# Patient Record
Sex: FEMALE | Race: WHITE
[De-identification: names, ages, dates, MRNs, and addresses within clinical notes are randomized per-mention and may not be internally consistent; named-entity substitution may affect disease eponyms.]

---

## 2017-07-26 ENCOUNTER — HOSPITAL ENCOUNTER (OUTPATIENT)
Dept: HOSPITAL 84 - D.MAMMO | Age: 52
Discharge: HOME | End: 2017-07-26
Attending: GENERAL PRACTICE
Payer: COMMERCIAL

## 2017-07-26 VITALS — BODY MASS INDEX: 26.7 KG/M2

## 2017-07-26 DIAGNOSIS — Z12.31: Primary | ICD-10-CM

## 2018-04-17 ENCOUNTER — HOSPITAL ENCOUNTER (EMERGENCY)
Dept: HOSPITAL 84 - D.ER | Age: 53
Discharge: HOME | End: 2018-04-17
Payer: COMMERCIAL

## 2018-04-17 VITALS — BODY MASS INDEX: 26.7 KG/M2

## 2018-04-17 DIAGNOSIS — S53.402A: Primary | ICD-10-CM

## 2018-04-17 DIAGNOSIS — S90.512A: ICD-10-CM

## 2018-04-17 DIAGNOSIS — S50.312A: ICD-10-CM
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
## 2018-04-17 DIAGNOSIS — S93.402A: ICD-10-CM
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
## 2018-08-15 ENCOUNTER — HOSPITAL ENCOUNTER (OUTPATIENT)
Dept: HOSPITAL 84 - D.CATH | Age: 53
Discharge: HOME | End: 2018-08-15
Attending: INTERNAL MEDICINE
Payer: COMMERCIAL

## 2018-08-15 VITALS — DIASTOLIC BLOOD PRESSURE: 73 MMHG | SYSTOLIC BLOOD PRESSURE: 159 MMHG

## 2018-08-15 VITALS — BODY MASS INDEX: 27.05 KG/M2 | BODY MASS INDEX: 27.05 KG/M2 | HEIGHT: 67 IN | WEIGHT: 172.36 LBS

## 2018-08-15 DIAGNOSIS — Z01.812: ICD-10-CM

## 2018-08-15 DIAGNOSIS — I25.119: Primary | ICD-10-CM

## 2018-08-15 LAB
ANION GAP SERPL CALC-SCNC: 13.1 MMOL/L (ref 8–16)
BASOPHILS NFR BLD AUTO: 0.7 % (ref 0–2)
BUN SERPL-MCNC: 7 MG/DL (ref 7–18)
CALCIUM SERPL-MCNC: 9 MG/DL (ref 8.5–10.1)
CHLORIDE SERPL-SCNC: 103 MMOL/L (ref 98–107)
CO2 SERPL-SCNC: 27.6 MMOL/L (ref 21–32)
CREAT SERPL-MCNC: 0.6 MG/DL (ref 0.6–1.3)
EOSINOPHIL NFR BLD: 2.4 % (ref 0–7)
ERYTHROCYTE [DISTWIDTH] IN BLOOD BY AUTOMATED COUNT: 13.1 % (ref 11.5–14.5)
GLUCOSE SERPL-MCNC: 87 MG/DL (ref 74–106)
HCG SERPL-ACNC: NEGATIVE M[IU]/ML
HCT VFR BLD CALC: 38.9 % (ref 36–48)
HGB BLD-MCNC: 13.8 G/DL (ref 12–16)
IMM GRANULOCYTES NFR BLD: 0.1 % (ref 0–5)
LYMPHOCYTES NFR BLD AUTO: 30.5 % (ref 15–50)
MCH RBC QN AUTO: 29 PG (ref 26–34)
MCHC RBC AUTO-ENTMCNC: 35.5 G/DL (ref 31–37)
MCV RBC: 81.7 FL (ref 80–100)
MONOCYTES NFR BLD: 9.1 % (ref 2–11)
NEUTROPHILS NFR BLD AUTO: 57.2 % (ref 40–80)
OSMOLALITY SERPL CALC.SUM OF ELEC: 275 MOSM/KG (ref 275–300)
PLATELET # BLD: 287 10X3/UL (ref 130–400)
PMV BLD AUTO: 10.7 FL (ref 7.4–10.4)
POTASSIUM SERPL-SCNC: 3.7 MMOL/L (ref 3.5–5.1)
RBC # BLD AUTO: 4.76 10X6/UL (ref 4–5.4)
SODIUM SERPL-SCNC: 140 MMOL/L (ref 136–145)
WBC # BLD AUTO: 6.8 10X3/UL (ref 4.8–10.8)

## 2018-08-27 ENCOUNTER — HOSPITAL ENCOUNTER (OUTPATIENT)
Dept: HOSPITAL 84 - D.US | Age: 53
Discharge: HOME | End: 2018-08-27
Attending: NURSE PRACTITIONER
Payer: COMMERCIAL

## 2018-08-27 VITALS — BODY MASS INDEX: 27 KG/M2
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
## 2018-08-27 DIAGNOSIS — S80.11XA: Primary | ICD-10-CM

## 2019-09-11 ENCOUNTER — HOSPITAL ENCOUNTER (OUTPATIENT)
Dept: HOSPITAL 84 - D.HCCARDIO | Age: 54
Discharge: HOME | End: 2019-09-11
Attending: INTERNAL MEDICINE
Payer: MEDICARE

## 2019-09-11 VITALS — BODY MASS INDEX: 27 KG/M2

## 2019-09-11 DIAGNOSIS — I25.10: Primary | ICD-10-CM

## 2019-09-25 ENCOUNTER — HOSPITAL ENCOUNTER (OUTPATIENT)
Dept: HOSPITAL 84 - D.CATH | Age: 54
Discharge: HOME | End: 2019-09-25
Attending: INTERNAL MEDICINE
Payer: MEDICARE

## 2019-09-25 VITALS
HEIGHT: 67 IN | HEIGHT: 67 IN | BODY MASS INDEX: 26.11 KG/M2 | BODY MASS INDEX: 26.11 KG/M2 | WEIGHT: 166.35 LBS | WEIGHT: 166.35 LBS

## 2019-09-25 VITALS — DIASTOLIC BLOOD PRESSURE: 62 MMHG | SYSTOLIC BLOOD PRESSURE: 137 MMHG

## 2019-09-25 DIAGNOSIS — R94.30: ICD-10-CM

## 2019-09-25 DIAGNOSIS — I25.110: Primary | ICD-10-CM

## 2019-09-25 LAB
ALT SERPL-CCNC: 32 U/L (ref 10–68)
ANION GAP SERPL CALC-SCNC: 11.9 MMOL/L (ref 8–16)
BASOPHILS NFR BLD AUTO: 1.2 % (ref 0–2)
BUN SERPL-MCNC: 14 MG/DL (ref 7–18)
CALCIUM SERPL-MCNC: 9 MG/DL (ref 8.5–10.1)
CHLORIDE SERPL-SCNC: 103 MMOL/L (ref 98–107)
CHOLEST/HDLC SERPL: 3 RATIO (ref 2.3–4.1)
CO2 SERPL-SCNC: 28.9 MMOL/L (ref 21–32)
CREAT SERPL-MCNC: 0.8 MG/DL (ref 0.6–1.3)
EOSINOPHIL NFR BLD: 2.7 % (ref 0–7)
ERYTHROCYTE [DISTWIDTH] IN BLOOD BY AUTOMATED COUNT: 13.6 % (ref 11.5–14.5)
GLUCOSE SERPL-MCNC: 85 MG/DL (ref 74–106)
HCT VFR BLD CALC: 39.7 % (ref 36–48)
HDLC SERPL-MCNC: 48 MG/DL (ref 32–96)
HGB BLD-MCNC: 13.9 G/DL (ref 12–16)
IMM GRANULOCYTES NFR BLD: 0 % (ref 0–5)
LDL-HDL RATIO: 1.7 RATIO (ref 1.5–3.5)
LDLC SERPL-MCNC: 81 MG/DL (ref 0–100)
LYMPHOCYTES NFR BLD AUTO: 37.6 % (ref 15–50)
MCH RBC QN AUTO: 28.4 PG (ref 26–34)
MCHC RBC AUTO-ENTMCNC: 35 G/DL (ref 31–37)
MCV RBC: 81.2 FL (ref 80–100)
MONOCYTES NFR BLD: 8.1 % (ref 2–11)
NEUTROPHILS NFR BLD AUTO: 50.4 % (ref 40–80)
OSMOLALITY SERPL CALC.SUM OF ELEC: 278 MOSM/KG (ref 275–300)
PLATELET # BLD: 247 10X3/UL (ref 130–400)
PMV BLD AUTO: 11 FL (ref 7.4–10.4)
POTASSIUM SERPL-SCNC: 3.8 MMOL/L (ref 3.5–5.1)
RBC # BLD AUTO: 4.89 10X6/UL (ref 4–5.4)
SODIUM SERPL-SCNC: 140 MMOL/L (ref 136–145)
TRIGL SERPL-MCNC: 76 MG/DL (ref 30–200)
WBC # BLD AUTO: 5.8 10X3/UL (ref 4.8–10.8)

## 2019-09-25 NOTE — NUR
PT RECEIVED VIA STRETCHER FROM CATH LAB FOR RECOVERY.  PT PLACED ON
MONITORS AND O2 PLACED AT 2L/NC.  6FR EXOCELE TO R GROIN, DRESSING
CDI NO BLEEDING OR HEMATOMA NOTED.  R LEG PINK AND WARM, PEDAL PULSES
PALPABLE.  IV INFUSING VIA ORDERS.  PT INSTRUCTED TO KEEP HEAD ON
PILLOW AND LEG STRAIGHT, SHE VERBALIZED UNDERSTANDING.  FAMILY AT
BEDSIDE, CALL LIGHT IN REACH

## 2019-09-25 NOTE — NUR
R GROIN SOFT, NO BLEEDING OR SWELLING NOTED.  HOB ELEVATED SLIGHTLY.
SANDWICH AND DRINK SERVED.  VSS.  DENIES OTHER NEEDS AT THIS TIME.

## 2019-09-25 NOTE — NUR
PT RESTING W EYES CLOSED, DENIES PAIN OR DISCOMFORT.  R GROIN
DRESSING CDI NO BLEEDING OR SWELLING NOTED.  LEG PINK AND WARM, PEDAL
PULSES PALPABLE. HR 62, /65, RR 13 SAT 99.  FAMILY AT BEDSIDE,
CALL LIGHT IN REACH

## 2019-09-25 NOTE — NUR
PT DOING WELL, VISITING W FAMILY.  R GROIN REMAINS SOFT, DRESSING CDI
NO BLEEDING OR HEMATOMA NOTED.  DENIES NEEDS.  TOLERATED SANDWICH W/O
NAUSEA.  CALL LIGHT IN REACH

## 2019-09-25 NOTE — NUR
PT RESTING W EYES CLOSED.  R GROIN SOFT, DRESSING CDI NO BLEEDING OR
HEMATOMA NOTED.  R LEG PINK AND WARM, PEDAL PULSES PALPABLE.  FAMILY
AT BEDSIDE.  VSS.  CALL LIGHT IN REACH

## 2019-09-25 NOTE — NUR
NO CHANGE IN PT CONDITION.  R GROIN DRESSING REMAINS CDI NO BLEEDING
OR SWELLING NOTED.  VSS.  CALL LIGHT IN REACH

## 2019-09-25 NOTE — NUR
PT STILL RESTING W EYES CLOSED.  R GROIN DRESSING REMAINS CDI NO
BLEEDING OR SWELLING NOTED.  PT DENIES PAIN OR NEEDS AT THIS TIME.
VSS.  LEG PINK AND WARM, PEDAL PULSES PALPABLE.  CALL LIGHT IN REACH

## 2019-09-25 NOTE — HEMODYNAMI
PATIENT:RAMU GOMEZ                            MEDICAL RECORD: [id number]
: 63                                            LOCATION:D.CAT          
ACCT# [id number]                                       ADMISSION DATE: 19
 
 
 Generatedon:201913:51
Patient name: RAMU GOMEZ Patient #: [id number] Visit #: [id number] SSN:
  :
1965 Date of study: 2019
Page: Of
Hemodynamic Procedure Report
****************************
Patient Data
Patient Demographics
Procedure consent was obtained
First Name: RAMU          Gender: Female
Last Name: JASON       : 1965
Middle Initial: JOSUE         Age: 54 year(s)
Patient #: [id number]       Race: 
Visit #: [id number]
SSN: 
Accession #:
[id number]
Additional ID: [id number]
Contact details
Address: [address] [place] [address]   Phone: [phone]
[address]
State: [place]
City: [facility]
Zip code: [id number]
Past Medical History
Allergies
Allergen        Reaction        Date         Comments
Reported
Other allergy                   8/15/2018    [facility]
Admission
Admission Data
Admission Date: 2019   Admission Time: 10:31
Arrival Date: 2019     Arrival Time: 13:15
Admit Source: Other         Insurance Payor: Private
health insurance
Height (in.): 67            BSA: 1.87 (m2)
Height (cm.): 170.18        BMI: 26 (kg/m2)
Weight (lbs.): 166
Weight (kg.): 75.3
Lab Results
Lab Result Date: 2019  Lab Result Time: 0:00
Biochemistry
Name         Units    Result                Min      Max
BUN          mg/dl    14       --(--*-)--   7        18
Creatinine   mg/dl    0.8      --(-*--)--   0.6      1.3
eGFR         ml/min   79.42993 *-(----)--   90       120
NONAFRICAN
CBC
Name         Units    Result                Min      Max
Hemoglobin   g/dl     13.9     --(*---)--   13.5     17.5
Procedure
Procedure Types
Cath Procedure
 
Diagnostic Procedure
LHC
LHC w/Coronaries
Sedation Charges
Moderate Sedation up to 15 minutes
PCI Procedure
Coronary Stent
Coronary Stent Initial
Procedure Description
Procedure Date
Procedure Date: 2019
Procedure Start Time: 13:25
Procedure End Time: 13:43
Procedure Staff
Name                            Function
Favian Caldwell MD              Performing Physician
Nguyen Lovell RT                    Monitor
Milo Brock RT                  Scrub
Jarvis Willoughby RN              Nurse
Procedure Data
Cath Procedure
Fluoroscopy
Diagnostic fluoroscopy      Total fluoroscopy Time: 2.5
time: 2.5 min               min
Diagnostic fluoroscopy      Total fluoroscopy dose: 525
dose: 525 mGy               mGy
Contrast Material
Contrast Material Type                       Amount (ml)
Isovue 300                                   82
Entry Location
Entry     Primary  Successful  Side  Size  Upsize Upsize Entry    Closure Succes
sful  Closure
Location                             (Fr)  1 (Fr) 2 (Fr) Remarks  Device        
      Remarks
Femoral                        Right 5 Fr  6 Fr                   Exoseal
artery                                     Short
Estimated blood loss: 5 ml
Diagnostic catheters
Device Type               Used For           End Catheter
Placement
MULTIPACK JL 4.0 5Fr      Left Coronary
catheter                  Angiography
MULTIPACK 3DRC 5Fr        Right Coronary
catheter                  Angiography
MULTIPACK Pigtail 5 Fr    LV Angiography
catheter
Procedure Complications
No complications
Procedure Medications
Medication           Administration Route Dosage
0.9% NaCl            I.V.                 100 ml/hr
Oxygen               etCO2 Nasal cannula  2 l/min
Heparin Flush Bag    added to field       2 bags
(1000units/500ml NS)
Lidocaine 2%         added to field       20
Benadryl             I.V.                 50 mg
Versed               I.V.                 2 mg
Fentanyl             I.V.                 100 mcg
Heparin Bolus        I.V.                 5000 units
Integrilin (Bolus    I.V.                 6.8 ml
 
2mg/ml)
Integrilin (Bolus    wasted               3.2 ml
2mg/ml)
Plavix               P.O.                 600 mg
Zofran               I.V.                 4 mg
Hemodynamics
Rest
BSA: 1.87 (m2) HGB: 13.9 (g/dl) O2 Consumption: Estimated: 173.99 (ml/min) O2 Co
nsumption indexed:
Estimated:93.04 (ml/min/m) Heart Rate: 61 (bpm)
Pressure Samples
Time     Site     Value (mmHg) Purpose      Heart      Use
Rate(bpm)
13:32    LV       160/21,24    Snapshot     64
13:33    AO       181/82(131)  Pullback     76
13:33    LV       147/19,24    Pullback     76
Gradients
Valve  Time  Site 1    Site 2      Mean    SEP/DFP    Peak To Heart  Use
(mmHg)  (sec/min)  Peak    Rate
(mmHg)  (bpm)
Aortic 13:33 LV        AO                             0       76
147/19,24 181/82(131)
Calculations
Valve    P-P      Mean      Valve     Index  Valve    Source
Name              Gradient  Area             Flow
(cm2)
Aortic   0
0
Snapshots
Pre Cath      Intra         NCS           Post Cath
Vital Signs
Time     Heart  Resp   SPO2 etCO2   NIBP (mmHg) Rhythm  Pain    Sedation
Rate   (ipm)  (%)  (mmHg)                      Status  Level
(bpm)
13:16:34 65     14     99   36.6    155/70(107) NSR     0 (11)  10(A)
, No
pain
13:20:56 58     17     100  38.8    149/72(103) NSR     0 (11)  10(A)
, No
pain
13:25:16 70     15     96   38      145/75(110) NSR     0 (11)  10(A)
, No
pain
13:29:38 73     17     91   17.1    139/66(99)  NSR     0 (11)  9(A)
, No
pain
13:34:35 82     17     93   0       150/66(95)  NSR     0 (11)  9(A)
, No
pain
13:38:45 85     19     92   21.6    123/67(94)  NSR     0 (11)  9(A)
, No
pain
13:42:53 94     10     99   10.4    132/82(109) NSR     0 (11)  10(A)
, No
pain
Medications
Time     Medication       Route   Dose  Verified Delivered Reason          Notes
  Effectiveness
by       by
13:18:38 0.9% NaCl        I.V.    100   Jarvis  Jarvis   Per physician
 
ml/hr Case Willoughby RN       RN
13:18:48 Oxygen           etCO2   2     Jarvis  Jarvis   for low 02 sats
Nasal   l/min Case Willoughby
cannula       RN       RN
13:18:59 Heparin Flush    added   2     Jarvis  Jarvis   used for
Bag              to      bags  Case Willoughby   procedure
(1000units/500ml field         RN       RN
NS)
13:19:10 Lidocaine 2%     added   20ml  Jarvis  Jarvis   for local
to      vial  Case Willoughby   anesthetic
field         RN       RN
13:19:26 Benadryl         I.V.    50 mg Jarvis  Jarvis   Per physician
Case Willoughby RN       RN
13:25:32 Versed           I.V.    2 mg  Jarvis  Jarvis   for sedation
Case Willoughby RN, RN
13:25:41 Fentanyl         I.V.    100   Jarvis  Jarvis   for sedation
mcg   Case Willoughby RN, RN
13:35:25 Heparin Bolus    I.V.    5000  Jarvis  Jarvis   for
units Case Willoughby   anticoagulation
RN       RN
13:35:43 Integrilin       I.V.    6.8   Jarvis  Jarvis   for
(Bolus 2mg/ml)           ml    Case Willoughby   antiplatelet
RN       RN        therapy
13:35:52 Integrilin       wasted  3.2   Jarvis  Jarvis   to sharp's
(Bolus 2mg/ml)           ml    Case Willoughby
RN       RN
13:44:59 Plavix           P.O.    600   Jarvis  Jarvis   for
mg    Case Willoughby   antiplatelet
RN       RN        therapy
13:45:19 Zofran           I.V.    4 mg  Jarvis  Jarvis   for nausea
Case Willoughby RN       RN
Procedure Log
Time     Note
12:59:55 Diagnostic Cath Status : Elective
13:00:00 Milo Brock RT(R) sent for patient. Start room use.
13:00:40 Informed consent obtained and on chart
13:01:12 Patient Height : 67 inches
13:01:16 Patient Weight : 166 lbs
13:01:16 Admit Source: Other
13:01:21 Insurance Payor : Private health insurance
13:01:26 Arrival Date: 2019 1:15:00 PM
13:04:54 Lab Result : eGFR NONAFRICAN 79.79990 ml/min
13:04:54 Lab Result : BUN 14 mg/dl
13:04:54 Lab Result : Creatinine 0.8 mg/dl
13:04:54 Lab Result : Hemoglobin 13.9 g/dl
13:05:10 2) 60-89 Mildly reduced kidney function, and other
findings (as for stage 1) point to kidney disease.
13:05:13 Maximum allowable contrast dose (3.7 X eGFR X 0.75)219
ml.
13:07:08 Time tracking: Regular hours (M-F 7:00 - 5:00)
13:07:11 Plan of Care:Hemodynamics will remain stable., Cardiac
rhythm will remain stable., Comfort level will be
maintained., Respiratory function will remain
adequate., Patient/ family verbilizes understanding of
procedure., Procedure tolerated without complication.,
 
Recovers from procedure without complications..
13:07:16 Patient received from Pre/Post Procedure Room to CCL 2
Alert and oriented. Tansferred to table in Supine
position.
13:07:17 Warm blankets applied, and fanny hugger turned on for
patient comfort.
13:07:18 Correct patient and procedure confirmed by team.
13:07:18 ECG and BP/O2 sat monitors applied to patient.
13:15:20 Baseline sample Acquired.
13:15:20 Vital chart was started
13:15:24 Rhythm: sinus rhythm
13:15:26 Full Disclosure recording started
13:15:29 H&P Date Dictated: 2019 Within 30 days and on
chart., H&P Addendum completed by physician on day of
procedure. (MUST COMPLETE FOR ALL OUTPATIENTS).
13:15:33 Pre-procedure instructions explained to patient.
13:15:34 Pre-op teaching completed and patient verbalized
understanding.
13:15:35 Family in waiting room.
13:15:37 Patient NPO since Midnight.
13:15:38 Is the patient allergic to Iodine/contrast media? No.
13:15:39 Was the patient premedicated? No
13:15:41 Is patient on blood thinner?No
13:15:43 Patient diabetic? No.
13:15:47 Previous problem with sedation/anesthesia? Yes nausea
13:15:50 Snore? No
13:15:51 Sleep apnea? No
13:15:51 Deviated septum? No
13:15:53 Opens mouth fully? Yes
13:15:54 Sticks out tongue? Yes
13:15:55 Airway obstruction? No ?
13:15:59 Dentures? Yes in tight
13:16:02 Pre procedure: right dorsailis pedis pulse 2+ Normal;
easily identifiable; not easily obliterated
13:16:04 Pre procedure: left dorsailis pedis pulse 2+ Normal;
easily identifiable; not easily obliterated
13:16:06 Patient pain scale 0/10 ?.
13:16:11 IV patent on arrival in left forearm with 0.9% NaCl at
[facility].
13:16:13 Lab results completed and on chart.
13:17:55 Right groin area was prepped with chlora-prep and
draped in sterile fashion
13:17:56 Alarms reviewed by R. N.
13:17:56 Sharps counted by scrub and verified by R.N.
13:17:57 Physician arrived
13:17:57 --------ALL STOP TIME OUT------
13:17:59 Final Timeout: patient, procedure, and site verified
with staff and physician. All members of the team are
in agreement.
13:18:00 Right groin site verified by team.
13:18:05 Fire Safety Assessment: A--An alcohol-based skin
anteseptic being used preoperatively., C--Open oxygen
or nitrous oxide is being used., D--An ESU, laser, or
fiber-optic light is being used.
13:18:08 Physical assessment completed. ASA score P 2 - A
patient with mild systemic disease as per Favian Caldwell MD.
13:18:13 Sedation plan: IV Moderate Sedation Medication:Versed,
Fentanyl
13:18:18 Use device set Femoral Dx
 
13:18:20 ACIST Syringe (29696) opened to sterile field.
13:18:20 Bag Decanter (2002S) opened to sterile field.
13:18:21 Medline Cath Pack ([id number]) opened to sterile field.
13:18:22 ACIST Hand Control (94651) opened to sterile field.
13:18:23 ACIST Manifold (04220) opened to sterile field.
13:18:23 DIAGNOSTIC Multipack 5Fr catheter set ([id number]) opened
to sterile field.
13:18:24 Tegaderm 4 x 4 (1626W) opened to sterile field.
13:18:25 SHEATH 5FR [place] (RIE774) opened to sterile field.
13:18:25 EMERALD Guide Wire (892-940) opened to sterile field.
13:18:38 0.9% NaCl 100 ml/hr I.V. was administered by Jarvis Willoughby RN; Per physician; Verbal order read back and
verified.
13:18:48 Oxygen 2 l/min etCO2 Nasal cannula was administered by
Jarvis Willoughby RN; for low 02 sats; Verbal order read
back and verified.
13:18:59 Heparin Flush Bag (1000units/500ml NS) 2 bags added to
field was administered by Jarvis Willoughby RN; used for
procedure; Verbal order read back and verified.
13:19:10 Lidocaine 2% 20ml vial added to field was administered
by Jarvis Willoughby RN; for local anesthetic; Verbal
order read back and verified.
13:19:26 Benadryl 50 mg I.V. was administered by Jarvis Willoughby RN; Per physician; Verbal order read back and
verified.
13:23:12 Zero performed for pressure channel P1
13:25:32 Versed 2 mg I.V. was administered by Jarvis Willoughby
RN; for sedation; Verbal order read back and verified.
13:25:39 Procedure started.
13:25:41 Fentanyl 100 mcg I.V. was administered by Jarvis Willoughby RN; for sedation; Verbal order read back and
verified.
13:25:42 Local anesthetic to right femoral artery with
Lidocaine 2% by Favian Caldwell MD.**INITIAL ACCESS
ONLY**
13:26:37 A 5 Fr sheath was inserted into the Right Femoral
artery
13:27:49 A MULTIPACK JL 4.0 5Fr catheter was advanced over the
wire and used for Left Coronary Angiography.
13:28:58 LCA angiography performed.
13:29:01 Injector settings: Ml/sec: 3, Volume: 6,
13:29:52 Catheter removed.
13:29:57 A MULTIPACK 3DRC 5Fr catheter was advanced over the
wire and used for Right Coronary Angiography.
13:30:00 RCA angiography performed.
13:30:03 Injector settings: Ml/sec: 3, Volume: 6,
13:30:23 Catheter removed.
13:30:33 A MULTIPACK Pigtail 5 Fr catheter was advanced over
the wire and used for LV Angiography.
13:31:33 WHISPER 300cm guide wire ([id number]) opened to sterile
field.
13:31:33 GUIDE 6FR XBLAD 3.5 catheter ([id number]) opened to
sterile field.
13:31:34 INFLATOR Merit BasixCompak (TP0403) opened to sterile
field.
13:31:34 SHEATH 6FR [place] (LAM592) opened to sterile field.
13:31:56 **ACC** Pre-intervention DIMITRI Flow is 3.
13:32:40 LV hemodynamics recorded.
13:32:41 LV gram done using ARCEO
13:32:43 Injector settings: Ml/sec: 5, Volume: 15,
 
13:33:07 EF : 55 %
13:33:34 Catheter removed.
13:34:04 Sheath upsized to a 6 Fr Short.
13:34:25 Pre PCI Site: Native pLAD has 80% stenosis.
13:34:37 6 Fr xblad 3.5 guide catheter was inserted over the
wire
13:35:14 bmw wire advanced.
13:35:25 Heparin Bolus 5000 units I.V. was administered by
Jarvis Willoughby RN; for anticoagulation; Verbal order
read back and verified.
13:35:43 Integrilin (Bolus 2mg/ml) 6.8 ml I.V. was administered
by Jarvis Willoughby RN; for antiplatelet therapy;
Verbal order read back and verified.
13:35:52 Integrilin (Bolus 2mg/ml) 3.2 ml wasted was
administered by Jarvis Willoughby RN; to sharp's; Verbal
order read back and verified.
13:38:44 Wire advanced across lesion.
13:40:29 Place stent Inflation Number: 1 A COBRA RX 3.0 X 12
Stent was prepped and advanced across the Prox LAD 80.
The stent was deployed at 14 ABHIJEET for 0:30 (min:sec) .
13:41:02 Post PCI Site: Native pLAD has 0% stenosis.
13:41:05 **ACC** Post-intervention DIMITRI Flow is 3.
13:41:15 Stent catheter was removed intact over wire.
13:41:16 Wire removed.
13:41:16 Guide catheter removed.
13:41:27 EXOSEAL 6Fr () opened to sterile field.
13:41:37 Sheath removed intact; hemostasis achieved with
Exoseal to the Right Femoral artery.
13:41:57 Procedure ended.(Physican Out)
13:42:28 Fluoroscopy time 02.50 minutes.
13:42:31 Fluoroscopy dose: 525 mGy
13:42:31 Flurop Dose total: 525
13:42:37 Dose Area Product 62480 mGy/cm.
13:42:40 Contrast amount:Isovue 300 82ml.
13:42:42 Sharps counted by scrub and verified by R.N.
13:42:46 Insertion/operative site no bleeding no hematoma.
13:42:50 Post-op/insertion site Right Femoral artery dressed
using a 4 x 4 and Tegaderm.
13:42:53 Post Procedure Pulses reassessed and unchanged
13:42:55 Post procedure rhythm: unchanged.
13:42:58 Estimated blood loss: 5 ml
13:43:00 Post procedure instruction explained to
patient.Patient verbalizes understanding.
13:43:01 Patient needs reinforcement of post procedure
teaching.
13:43:12 Procedure type changed to Cath procedure, Diagnostic
procedure, LHC, C w/Coronaries, Sedation Charges,
Moderate Sedation up to 15 minutes, PCI procedure,
Coronary Stent, Coronary Stent Initial
13:43:12 Procedure and supply charges have been captured,
reviewed, submitted and are correct.
13:43:20 Procedure Complication : No complications
13:43:24 Vital chart was stopped
13:43:25 See physician's report for complete and final results.
13:43:29 Report given to Pre/Post Procedure Room.
13:43:31 Patient transfered to Pre/Post Procedure Room with
Stretcher.
13:43:34 Procedure ended.
13:43:34 Full Disclosure recording stopped
13:43:42 **ACC-PCI Only** Patient was given prescriptions, or
 
instructed by Favian Caldwell MD to start/continue the
following medications upon discharge: Plavix
13:44:19 End room use (Document Last)
13:44:59 Plavix 600 mg P.O. was administered by Jarvis Willoughby RN; for antiplatelet therapy; Verbal order read back
and verified.
13:45:19 Zofran 4 mg I.V. was administered by Jarvis Willoughby RN; for nausea; Verbal order read back and verified.
Intervention Summary
Intervention Notes
Time     ActionType  Lesion and  Equipment Action#  Pressure  Duration
Attributes  Used
13:40:29 Place stent Prox LAD    COBRA RX  1        14        00:30
3.0 X 12
Stent
Device Usage
Item Name      Manufacture  Quantity  Catalog       Hospital Part       Current 
Minimal Lot# /
Number        Charge   Number     Stock   Stock   Serial#
Code
ACIST Syringe  Acist        1         32957         200108   792225     758998  
20
(92631)        Medical
Systems Inc
Bag Decanter   Microtek     1         S         466909   33254      735103  
5
(S)        Medical Inc.
Medline Cath   Medline      1         [id number]     274263   69385      296156  
5
Pack
([id number])
ACIST Hand     Acist        1         35285         805311   947159     281268  
5
Control        Medical
(35484)        Systems Inc
ACIST Manifold Acist        1         26180         778293   843774     872932  
5
(26782)        Medical
Systems Inc
DIAGNOSTIC     Cardinal     1         IQ6414        521019   52884      567212  
30
Multipack 5Fr  Health
catheter set
(CL9470)
Tegaderm 4 x 4 3M           1         1626W         863421   011289     485580  
5
(1626W)
SHEATH 5FR     Terumo       1         BDL767        558163   274786     664016  
5
Newville
(YAY501)
EMERALD Guide  Cardinal     1         502-455       180945   993796     994275  
5
Wire (502455) Health
MULTIPACK JL   Cardinal     1                                           813716  
5
4.0 5Fr        Health
catheter
MULTIPACK 3DRC Cardinal     1                                           945811  
5
 
5Fr catheter   Health
MULTIPACK      Cardinal     1                                           655282  
5
Pigtail 5 Fr   Health
catheter
WHISPER 300cm  Alfredo       1         [id number]     557451   731558     392293  
5
guide wire     Vascular
([id number])
GUIDE 6FR      Cardinal     1         [id number]      551584   483971     626304  
10
XBLAD 3.5      Health
catheter
(93460997)
INFLATOR Merit Merit        1         NP7671        220498   828082     285044  
15
y prime
(AB5832)
SHEATH 6FR     Terumo       1         IUV751        086388   579388     054980  
40
Newville
(UHP056)
COBRA RX 3.0 X Celonova     1         [id number]  802151   651279287  2540262 
1       [id number]
12 stent       [employer]
([id number])
EXOSEAL 6Fr    Cardinal     1                  815808   569547     557017  
10
()        Health
Signature Audit [place]
Stage           Time        Signature      Unsigned
Intra-Procedure 2019   Nguyen Lovell
1:48:46 PM  RT(R)
Intra-Procedure 2019   Jarvis
1:50:11 PM  Case AYALA
Intra-Procedure 2019   Favian Naylor
1:51:05 PM  Johann WELCH
 
 
 
 
 
 
 
 
 
 
 
 
 
 
 
 
 
 
[facility]                                          
[address] [place] [address]

## 2019-09-25 NOTE — NUR
PT RESTING COMFORTABLY, R GROIN DRESSING REMAINS CDI NO BLEEDING OR
SWELLING NOTED. LEG PINK AND WARM, PEDAL PULSES REMAIN PALPABLE.
CALL LIGHT IN REACH

## 2019-09-25 NOTE — NUR
DISCHARGE INSTRUCTIONS REVIEWED W PT AND , BOTH VERBALIZED
UNDERSTANDING.  EXPLAINED THE IMPORTANCE OF STARTING PLAVIX TOMORROW.
IV REMOVED W CATH INTACT, MONITORS REMOVED AND PT UP TO DRESS FOR
DISCHARGE.
1735 PT AMBULATED TO BR, VOIDING W/O DIFFICULITY.

## 2019-09-26 NOTE — OP
PATIENT NAME:  RAMU GOMEZ                     MEDICAL RECORD: [id number]
:63                                             LOCATION:D.CAT          
                                                         ADMISSION DATE:        
SURGEON:  SWETA FULLER MD          
 
 
DATE OF OPERATION:  2019
 
PROCEDURES:  Left heart catheterization, selective coronary angiography, right
femoral artery approach.
 
CATHETERS:  A 5-Cymro sheath, 5/4 left and right Dinah, 5/4 pig.
 
The procedure was well tolerated.  The patient returned to the tavarez, sheath
removed.  ExoSeal device placed.
 
FINDINGS:  Left ventriculography in 30-degree ARCEO view, normal wall motion,
normal systolic function.
 
CORONARY ANATOMY:
LEFT MAIN:  Left main is free of disease.
LAD:  Has discrete 80% stenosis at the takeoff of the first septal branch
correlating nicely with nuclear study.
CIRCUMFLEX:  Circumflex was free of disease.
 
RIGHT CORONARY ARTERY:  The right coronary artery is a large, dominant right. 
Widely patent stent, no progression of native disease.
 
IMPRESSION:  Progression of LAD disease.
 
Plan on intervention to the LAD momentarily.
 
DESCRIPTION OF PROCEDURE:  A 5-Cymro sheath was exchanged for a 6-Cymro
sheath.  XB LAD guiding catheter provided excellent guiding catheter support. 
This was followed by 300 cm Whisper wire, which was placed across tightly
occluded LAD at the distal portion of vessel.  Stent deployed was a 3.0 x 12
Cobra stent up to 14 atmospheres for 45 seconds.  Final angiography showed
excellent resolution of 90% stenosis to no significant residual.  Nice step up
and step down.  DIMITRI flow was 3 throughout the procedure.  Sheath closed with
ExoSeal device.  Plavix loaded in the lab.
 
TRANSINT:[id number] Voice Confirmation ID: [id number] DOCUMENT ID: [id number]
                                           
                                           SWETA FULLER MD          
 
 
 
Electronically Signed by SWETA FULLER on 19 at 1308
 
 
CC:                                                             [id number]
DICTATION DATE: 19 135     :     19 9683      DEP CLI 
                                                                      19
[facility]                                          
[address] [place] [address]

## 2019-11-15 ENCOUNTER — HOSPITAL ENCOUNTER (OUTPATIENT)
Dept: HOSPITAL 84 - D.MAMMO | Age: 54
Discharge: HOME | End: 2019-11-15
Attending: NURSE PRACTITIONER
Payer: MEDICARE

## 2019-11-15 VITALS — BODY MASS INDEX: 26 KG/M2

## 2019-11-15 DIAGNOSIS — Z12.31: Primary | ICD-10-CM
